# Patient Record
Sex: MALE
[De-identification: names, ages, dates, MRNs, and addresses within clinical notes are randomized per-mention and may not be internally consistent; named-entity substitution may affect disease eponyms.]

---

## 2022-09-28 ENCOUNTER — APPOINTMENT (OUTPATIENT)
Dept: ORTHOPEDIC SURGERY | Facility: CLINIC | Age: 52
End: 2022-09-28

## 2022-09-28 VITALS — WEIGHT: 265 LBS | BODY MASS INDEX: 35.89 KG/M2 | HEIGHT: 72 IN

## 2022-09-28 PROBLEM — Z00.00 ENCOUNTER FOR PREVENTIVE HEALTH EXAMINATION: Status: ACTIVE | Noted: 2022-09-28

## 2022-09-28 PROCEDURE — 99072 ADDL SUPL MATRL&STAF TM PHE: CPT

## 2022-09-28 PROCEDURE — 73130 X-RAY EXAM OF HAND: CPT | Mod: LT

## 2022-09-28 PROCEDURE — 99204 OFFICE O/P NEW MOD 45 MIN: CPT

## 2022-09-28 RX ORDER — METHYLPREDNISOLONE 4 MG/1
4 TABLET ORAL
Qty: 1 | Refills: 0 | Status: ACTIVE | COMMUNITY
Start: 2022-09-28 | End: 1900-01-01

## 2022-09-28 NOTE — HISTORY OF PRESENT ILLNESS
[de-identified] : Patient comes in after work related injury that occurred in June.  He says that he was using a rig jerked backwards jammed his ring and small fingers twice and hit his hand.  Since that time he is having difficulty bending the small finger.  He has had numbness and tingling as well.  He thought it would improve.  It did not improve.  He saw a no other doctor who sent him for an MRI.  He does not have other complaints today.  He has been wearing a cock-up wrist splint.

## 2022-09-28 NOTE — ASSESSMENT
[FreeTextEntry1] :  while the show that the FDP was intact in the superficialis was ruptured, I believe it is the other way around.  I reviewed the MRI  myself in did see tendon attached to the DIP or the distal phalanx.  The patient unfortunately is unable to flex at the DIP joint.  Either way, it is 3 months after his injury if not more.  A flexor tendon rupture should be repaired within 2 weeks and at most by 6 weeks and he has past that point.  I discussed the possibility with the patient going in and performing an exploration depending upon where the rupture is.  There is a possibility that there he shins to the ruptured tendon to the other tendon not allowing for flexion.  The option also is left that he does not need to do any surgery for this and he will not be able to flex at the DIP joint but he will still be able to move his fingers.  If we do some sort of surgery to repair 1 of the tendons there is a chance he may always have somewhat of a contracture, he may always have some scar tissue, he may always needed Tenolysis and he may lose motion in the hand worse than it currently is.  Right now the patient is going to think about what he would like to do regarding this problem.\par \par The patient was advised of the diagnosis.  The natural history of the pathology was explained in full to the patient in layman's terms. We discussed the nature of the nerve as an electrical cable and what happens to the nerve in carpal tunnel syndrome.  We discussed that treatment for night symptoms included night bracing.  We discussed the possibility of injection when symptoms were intermittent or in patients who were unwilling to undergo surgery with constant symptoms.  We discussed that injection is a diagnostic and therapeutic aide and what this means.  We discussed the use of nerve testing in cases when diagnosis was in doubt or for confirmation to exclude alternate pathology.  We discussed that if symptoms were 24/7 surgery was recommended to give the nerve the best chance to recover but that once symptoms were constant, the nerve may not recover even with surgery.  We discussed that if left alone the nerve progression could worsen and that treatment was indicated to prevent progression of nerve compression.  The longer the nerve is left, the more likely to cause worsening irreversible damage.  All questions were answered.  The risks and benefits of surgical and non-surgical treatment alternatives were explained in full to the patient.\par   I believe the patient has developed carpal tunnel after this injury as well.  He has numbness and tingling at all times.  I would like an EMG NCV.  I am also sending a Medrol Dosepak.  He will follow up with me once the EMG NCV is complete.  He will also call if he decides he would like to move forward with surgical intervention for the tendon.

## 2022-09-28 NOTE — IMAGING
[de-identified] : L hand: \par Tender volar wrist \par Good finger ROM \par +Tinels \par +Phalens \par +Compression test \par   Tingling sensation median nerve distribution\par  able to flex PIP joint small finger unable to flex DIP joint\par

## 2022-10-12 ENCOUNTER — TRANSCRIPTION ENCOUNTER (OUTPATIENT)
Age: 52
End: 2022-10-12

## 2022-10-13 ENCOUNTER — FORM ENCOUNTER (OUTPATIENT)
Age: 52
End: 2022-10-13

## 2022-11-02 ENCOUNTER — APPOINTMENT (OUTPATIENT)
Dept: ORTHOPEDIC SURGERY | Facility: CLINIC | Age: 52
End: 2022-11-02

## 2022-11-02 PROCEDURE — 99214 OFFICE O/P EST MOD 30 MIN: CPT

## 2022-11-02 PROCEDURE — 99072 ADDL SUPL MATRL&STAF TM PHE: CPT

## 2022-11-04 NOTE — WORK
[Total] : total [Does not reveal pre-existing condition(s) that may affect treatment/prognosis] : does not reveal pre-existing condition(s) that may affect treatment/prognosis [Cannot return to work because ________] : cannot return to work because [unfilled] [I provided the services listed above] :  I provided the services listed above. [FreeTextEntry1] : fair [FreeTextEntry3] : lg

## 2022-11-04 NOTE — HISTORY OF PRESENT ILLNESS
[de-identified] : The patient comes in for follow-up of his numbness and tingling in left hand.  After a gave him Medrol Dosepak he says the range of motion is gone significantly better the swelling has gotten better but he still having numbness and tingling.  That has not changed.  It has been 5 months since his injury.

## 2022-11-04 NOTE — IMAGING
[de-identified] : L hand: \par Tender volar wrist \par Good finger ROM \par +Tinels \par +Phalens \par +Compression test \par   Tingling sensation median nerve distribution\par  able to flex PIP joint small finger unable to flex DIP joint\par

## 2022-11-04 NOTE — ASSESSMENT
[FreeTextEntry1] :   The patient is doing okay regarding range of motion of fingers.  He is okay that the tip of the small finger does not bend right now.  The numbness and tingling is with bothers him significantly.The EMG NCV shows moderate carpal tunnel.\par \par We discussed the recommendation for carpal tunnel surgery- We reviewed that the goal of surgery is to prevent worsening and give the nerve a chance to recover. If symptoms are constant there is a chance that the nerve is already too badly damaged and no longer has the potential to recover.Risks, benefits, and alternatives of surgery discussed with patient (and family).Risks including but not limited to infection, blood loss, tendon damage, muscle damage, nerve damage, stiffness, pain, no resolution of symptoms, CRPS, loss of function, potential for secondary surgery, loss of limb or life.Patient understands and was allowed to voice questions or concerns.They agree to surgery\par   Patient has numbness and tingling.  He has not improved in 5 months.  I am recommending surgical intervention.  We also need authorization for therapy.

## 2022-11-14 ENCOUNTER — FORM ENCOUNTER (OUTPATIENT)
Age: 52
End: 2022-11-14

## 2022-11-15 ENCOUNTER — FORM ENCOUNTER (OUTPATIENT)
Age: 52
End: 2022-11-15

## 2022-12-14 ENCOUNTER — APPOINTMENT (OUTPATIENT)
Dept: ORTHOPEDIC SURGERY | Facility: CLINIC | Age: 52
End: 2022-12-14

## 2022-12-14 PROCEDURE — 99213 OFFICE O/P EST LOW 20 MIN: CPT

## 2022-12-14 PROCEDURE — 99072 ADDL SUPL MATRL&STAF TM PHE: CPT

## 2022-12-14 NOTE — IMAGING
[de-identified] : L hand: \par Tender volar wrist \par Good finger ROM \par +Tinels \par +Phalens \par +Compression test \par   Tingling sensation median nerve distribution\par  able to flex PIP joint small finger unable to flex DIP joint, decreased  strength\par

## 2022-12-14 NOTE — HISTORY OF PRESENT ILLNESS
[de-identified] : Patient still has numbness and tingling in his hand.  He says that he is able to make a  better than before but still cannot make a full .  He has difficulty grabbing things.  The small finger bothers him.  Numbness and tingling bothers him as well.

## 2022-12-14 NOTE — ASSESSMENT
[FreeTextEntry1] :   The patient is doing okay regarding range of motion of fingers.  He is okay that the tip of the small finger does not bend right now.  The numbness and tingling is with bothers him significantly.The EMG NCV shows moderate carpal tunnel.\par \par   The patient carpal tunnel symptoms.  He is trying to get this established.  As result of his fall he developed carpal tunnel.  The small finger bothers him significantly.  I am recommending therapy for the small finger so he can work on the  strength and range of motion.

## 2022-12-20 ENCOUNTER — FORM ENCOUNTER (OUTPATIENT)
Age: 52
End: 2022-12-20

## 2023-01-25 ENCOUNTER — APPOINTMENT (OUTPATIENT)
Dept: ORTHOPEDIC SURGERY | Facility: CLINIC | Age: 53
End: 2023-01-25
Payer: OTHER MISCELLANEOUS

## 2023-01-25 PROCEDURE — 99072 ADDL SUPL MATRL&STAF TM PHE: CPT

## 2023-01-25 PROCEDURE — 99213 OFFICE O/P EST LOW 20 MIN: CPT

## 2023-01-25 NOTE — HISTORY OF PRESENT ILLNESS
[de-identified] : Patient still has numbness and tingling in his hand. He said on [place] he couldn't feel his hands.He has been going to therapy. He said the exercises he is doing brings some relief. He is currently not working. He is getting frustrated that he cannot make a full fist.  He has not gotten the carpal tunnel made as part of the case.\par \par

## 2023-01-25 NOTE — ASSESSMENT
[FreeTextEntry1] :   The patient is doing okay regarding range of motion of fingers.  He is okay that the tip of the small finger does not bend right now.  The numbness and tingling is with bothers him significantly.The EMG NCV shows moderate carpal tunnel.\par \par   The patient carpal tunnel symptoms.  He is trying to get this established.  As result of his fall he developed carpal tunnel.  The small finger bothers him significantly.  I am recommending therapy for the small finger so he can work on the  strength and range of motion.\par \par   He is going to work on getting the carpal tunnel established as part of the case.

## 2023-01-25 NOTE — IMAGING
[de-identified] : L hand: \par Tender volar wrist \par Good finger ROM \par +Tinels \par +Phalens \par +Compression test \par   Tingling sensation median nerve distribution\par  able to flex PIP joint small finger unable to flex DIP joint, decreased  strength\par

## 2023-03-15 ENCOUNTER — APPOINTMENT (OUTPATIENT)
Dept: ORTHOPEDIC SURGERY | Facility: CLINIC | Age: 53
End: 2023-03-15
Payer: OTHER MISCELLANEOUS

## 2023-03-15 ENCOUNTER — NON-APPOINTMENT (OUTPATIENT)
Age: 53
End: 2023-03-15

## 2023-03-15 PROCEDURE — 99072 ADDL SUPL MATRL&STAF TM PHE: CPT

## 2023-03-15 PROCEDURE — 99213 OFFICE O/P EST LOW 20 MIN: CPT

## 2023-03-15 NOTE — IMAGING
[de-identified] : L hand: \par Tender volar wrist \par Good finger ROM \par +Tinels \par +Phalens \par +Compression test \par Normal sensation median nerve distribution\par  able to flex PIP joint small finger unable to flex DIP joint, decreased  strength however improved\par

## 2023-03-15 NOTE — WORK
[Does not reveal pre-existing condition(s) that may affect treatment/prognosis] : does not reveal pre-existing condition(s) that may affect treatment/prognosis [I provided the services listed above] :  I provided the services listed above. [Partial] : partial [Can return to work without limitations on ______] : can return to work without limitations on [unfilled] [FreeTextEntry1] : fair [FreeTextEntry3] : lg

## 2023-03-15 NOTE — HISTORY OF PRESENT ILLNESS
[de-identified] : Patient still has numbness and tingling in his hand but not all the time. It is not as bad as it was. He wears his braces at night. \par He has been going to therapy. He is currently not working.  He says the numbness and tingling comes and goes it is not as bad and is something that he can deal with.  Some days he thinks it is completely gone.  He still is having a problem with the small finger.  But he is able to use it.  He says sometimes he has pain and some tightness in the finger.\par \par

## 2023-03-15 NOTE — ASSESSMENT
[FreeTextEntry1] : The patient is doing okay regarding range of motion of fingers.  He is okay that the tip of the small finger does not bend right now.  He has been trying to use his hand normally.\par The patient carpal tunnel symptoms.  He did not have carpal tunnel symptoms prior to this injury.  This only occurred after the injury at work.  He does have carpal tunnel symptoms and it was a result or did result after this injury occurred.  He is going to return to work.  I will see him in 6 to 8 weeks

## 2023-05-02 ENCOUNTER — APPOINTMENT (OUTPATIENT)
Dept: ORTHOPEDIC SURGERY | Facility: CLINIC | Age: 53
End: 2023-05-02
Payer: OTHER MISCELLANEOUS

## 2023-05-02 PROCEDURE — 99213 OFFICE O/P EST LOW 20 MIN: CPT

## 2023-05-03 NOTE — IMAGING
[de-identified] : L hand: \par Tender volar wrist \par Good finger ROM \par +Tinels \par +Phalens \par +Compression test \par Normal sensation median nerve distribution\par  able to flex PIP joint small finger unable to flex DIP joint, decreased  strength \par

## 2023-05-03 NOTE — ASSESSMENT
[FreeTextEntry1] : Patient is doing better regarding the carpal tunnel.  He will continue doing what he has been doing.  If the symptoms return he will return and we will address that.  His main issue is not being able to make a full  and have the same strength.  I am recommending more therapy for the patient.  He will follow-up in about 8 weeks.

## 2023-05-03 NOTE — HISTORY OF PRESENT ILLNESS
[de-identified] : Patient still has numbness and tingling in his hand but not all the time. It is not as bad as it was. He wears his braces at night. \par He states the numbness and tingling is a lot better than prior.  He still does not have a full strength in the hand.  He is getting better range of motion but not full.  He is aware that he will not gain motion at the DIP joint.  He is having a problem with the ring finger because it is closely related to the small finger.\par

## 2023-07-11 ENCOUNTER — APPOINTMENT (OUTPATIENT)
Dept: ORTHOPEDIC SURGERY | Facility: CLINIC | Age: 53
End: 2023-07-11
Payer: OTHER MISCELLANEOUS

## 2023-07-11 PROCEDURE — 99213 OFFICE O/P EST LOW 20 MIN: CPT

## 2023-07-11 NOTE — HISTORY OF PRESENT ILLNESS
[de-identified] : The patient comes in for a follow up. He started therapy. He is on his 4th session. He states his strength has improved.  He states his numbness and tingling is a lot better. He states he is having mild symptoms of carpal tunnel.

## 2023-07-11 NOTE — ASSESSMENT
[FreeTextEntry1] : The patient is improving. He will continue with therapy. The carpal tunnel has been improving as well. He will follow up in 6-8 weeks.

## 2023-07-11 NOTE — IMAGING
[de-identified] : L hand: \par Tender volar wrist \par Good finger ROM \par +Tinels \par +Phalens \par +Compression test \par Normal sensation median nerve distribution\par  able to flex PIP joint small finger unable to flex DIP joint, decreased  strength \par

## 2023-08-22 ENCOUNTER — APPOINTMENT (OUTPATIENT)
Dept: ORTHOPEDIC SURGERY | Facility: CLINIC | Age: 53
End: 2023-08-22
Payer: OTHER MISCELLANEOUS

## 2023-08-22 PROCEDURE — 99214 OFFICE O/P EST MOD 30 MIN: CPT

## 2023-08-25 NOTE — IMAGING
[de-identified] : L hand: \par  Tender volar wrist \par  Good finger ROM \par  +Tinels \par  +Phalens \par  +Compression test \par  Normal sensation median nerve distribution\par   able to flex PIP joint small finger unable to flex DIP joint, decreased  strength \par

## 2023-08-25 NOTE — ASSESSMENT
[FreeTextEntry1] : We discussed the recommendation for carpal tunnel surgery- We reviewed that the goal of surgery is to prevent worsening and give the nerve a chance to recover. If symptoms are constant there is a chance that the nerve is already too badly damaged and no longer has the potential to recover.Risks, benefits, and alternatives of surgery discussed with patient (and family).Risks including but not limited to infection, blood loss, tendon damage, muscle damage, nerve damage, stiffness, pain, no resolution of symptoms, CRPS, loss of function, potential for secondary surgery, loss of limb or life.Patient understands and was allowed to voice questions or concerns. He will discuss surgery with his supervisor at work. He will contact us if he wants to move forward with surgical intervention.  If he does not wish to move forward with surgical intervention, he will follow up in 6-8 weeks.

## 2023-08-25 NOTE — HISTORY OF PRESENT ILLNESS
[de-identified] : The patient comes in for a follow up.  The patient states therapy has not helped. He states he does not seen any improvement from last visit.

## 2024-02-22 ENCOUNTER — APPOINTMENT (OUTPATIENT)
Dept: ORTHOPEDIC SURGERY | Facility: CLINIC | Age: 54
End: 2024-02-22
Payer: OTHER GOVERNMENT

## 2024-02-22 ENCOUNTER — NON-APPOINTMENT (OUTPATIENT)
Age: 54
End: 2024-02-22

## 2024-02-22 VITALS — WEIGHT: 270 LBS | BODY MASS INDEX: 36.57 KG/M2 | HEIGHT: 72 IN

## 2024-02-22 DIAGNOSIS — S46.012A STRAIN OF MUSCLE(S) AND TENDON(S) OF THE ROTATOR CUFF OF LEFT SHOULDER, INITIAL ENCOUNTER: ICD-10-CM

## 2024-02-22 PROCEDURE — 99213 OFFICE O/P EST LOW 20 MIN: CPT | Mod: ACP

## 2024-02-22 PROCEDURE — 73030 X-RAY EXAM OF SHOULDER: CPT | Mod: LT

## 2024-02-22 RX ORDER — DICLOFENAC SODIUM 75 MG/1
75 TABLET, DELAYED RELEASE ORAL
Qty: 60 | Refills: 0 | Status: ACTIVE | COMMUNITY
Start: 2024-02-22 | End: 1900-01-01

## 2024-02-22 RX ORDER — TIZANIDINE 4 MG/1
4 TABLET ORAL
Qty: 30 | Refills: 0 | Status: ACTIVE | COMMUNITY
Start: 2024-02-22 | End: 1900-01-01

## 2024-02-22 NOTE — IMAGING
[de-identified] : Examination of the left shoulder, patient has no swelling, no ecchymosis, no erythema. Some discomfort on palpating over the posterior aspect of the shoulder. Nontender over the AC joint. Patient has painful range of motion of the arc of motion of the left shoulder. Patient has negative drop arm test, but patient has positive resisted supraspinatus. Positive impingement. Positive [place]'s. Positive apprehension. Negative speeds. Mild decreased motor strength to the rotator cuff muscles. Neurovascular intact.  X-ray of the left shoulder was done in office today, negative for: Acute fracture or dislocation, patient has degenerative joint disease over the AC joint and mild high riding of the humeral head.

## 2024-02-22 NOTE — HISTORY OF PRESENT ILLNESS
[de-identified] : 53-year-old male here for an aspiration of pain to the left shoulder, patient states that this pain is started after he was taking some tracks at work, patient states that this pain is started yesterday.  21st 2024, and the pain kept him up throughout the night, this morning he has severe pain when he tried to put his jacket, he states that he went to work, but he could not continue doing the activities needed for work, patient states that the pain is significant.

## 2024-02-22 NOTE — DISCUSSION/SUMMARY
[de-identified] : Impression: Left shoulder injury possible rotator cuff strain.  Plan: Patient was advised for physical therapy. Patient was advised for resting, icing and activities as tolerated. Patient was advised to stay out of work until Monday.  26 2024 Anti-inflammatories and muscle relaxant was sent to the pharmacy.  Follow-up: 4 to 6 weeks for shoulder specialist

## 2024-02-28 ENCOUNTER — NON-APPOINTMENT (OUTPATIENT)
Age: 54
End: 2024-02-28

## 2024-03-19 ENCOUNTER — APPOINTMENT (OUTPATIENT)
Dept: ORTHOPEDIC SURGERY | Facility: CLINIC | Age: 54
End: 2024-03-19
Payer: OTHER MISCELLANEOUS

## 2024-03-19 PROCEDURE — 99213 OFFICE O/P EST LOW 20 MIN: CPT

## 2024-03-20 NOTE — WORK
[Does not reveal pre-existing condition(s) that may affect treatment/prognosis] : does not reveal pre-existing condition(s) that may affect treatment/prognosis [I provided the services listed above] :  I provided the services listed above. [Can return to work without limitations on ______] : can return to work without limitations on [unfilled] [Mild Partial] : mild partial [FreeTextEntry1] : fair [FreeTextEntry3] : lg

## 2024-03-20 NOTE — IMAGING
[de-identified] : L hand:  Tender volar wrist  Good finger ROM  -Tinels  -Phalens  -Compression test  Normal sensation median nerve distribution  able to flex PIP joint small finger unable to flex DIP joint, decreased  strength

## 2024-03-20 NOTE — ASSESSMENT
[FreeTextEntry1] : The patient is improving. He states he will consider surgery, if the numbness and tingling gets worse. He will speak to his  about a schedule loss of use.

## 2024-03-20 NOTE — HISTORY OF PRESENT ILLNESS
[de-identified] : The patient comes in for a follow up. The patient states he is doing well. The patient states he is unable to bend his left small finger. He states he does not have much numbness and tingling.

## 2024-03-22 ENCOUNTER — APPOINTMENT (OUTPATIENT)
Dept: ORTHOPEDIC SURGERY | Facility: CLINIC | Age: 54
End: 2024-03-22

## 2024-04-12 ENCOUNTER — APPOINTMENT (OUTPATIENT)
Dept: ORTHOPEDIC SURGERY | Facility: CLINIC | Age: 54
End: 2024-04-12
Payer: OTHER MISCELLANEOUS

## 2024-04-12 DIAGNOSIS — G56.02 CARPAL TUNNEL SYNDROME, LEFT UPPER LIMB: ICD-10-CM

## 2024-04-12 DIAGNOSIS — S66.812A STRAIN OF OTHER SPECIFIED MUSCLES, FASCIA AND TENDONS AT WRIST AND HAND LEVEL, LEFT HAND, INITIAL ENCOUNTER: ICD-10-CM

## 2024-04-12 PROCEDURE — 99243 OFF/OP CNSLTJ NEW/EST LOW 30: CPT

## 2024-04-12 NOTE — ASSESSMENT
[FreeTextEntry1] : The patient comes in a scheduled loss of use. The patient states he is unable to make a tight fist.   Left Hand: Thumb: MPJ  60 degrees, IPJ 65 degrees, Index Finger: MPJ 90 degrees, PIPJ 95 degrees, DIPJ 70 degrees Middle Finger:  MPJ 95 degrees, PIPJ 95 degrees, DIPJ 70 degrees Ring Finger: MPJ 97 degrees, PIPJ 100 degrees, DIPJ 70 degrees Small Finger: MPJ 97 degrees, PIPJ 105 degrees, DIPJ 70 degrees positive Durkan's, no active flexion of DIPJ small finger dynamometer: 55 lbs. of force, 52 lbs. of force,  40 lbs. of force    Right Hand Thumb: MPJ  60 degrees, IPJ 75 degrees Index Finger: MPJ 95 degrees, PIPJ 115 degrees, DIPJ 80 degrees Middle Finger:  MPJ 95 degrees, PIPJ 115 degrees, DIPJ 80 degrees Ring Finger: MPJ  100 degrees, PIPJ 115 degrees, DIPJ 80 degrees Small Finger:  degrees, PIPJ 115 degrees, DIPJ 80 degrees dynamometer: 2 x 95 lbs. of force, 90 lbs. of force

## 2024-09-24 ENCOUNTER — APPOINTMENT (OUTPATIENT)
Dept: ORTHOPEDIC SURGERY | Facility: CLINIC | Age: 54
End: 2024-09-24
Payer: OTHER MISCELLANEOUS

## 2024-09-24 DIAGNOSIS — S46.012A STRAIN OF MUSCLE(S) AND TENDON(S) OF THE ROTATOR CUFF OF LEFT SHOULDER, INITIAL ENCOUNTER: ICD-10-CM

## 2024-09-24 PROCEDURE — 99203 OFFICE O/P NEW LOW 30 MIN: CPT

## 2024-09-24 NOTE — HISTORY OF PRESENT ILLNESS
[de-identified] : Patient is here for evaluation of left shoulder pain Affecting quality of life Wakes up at night due to pain  wc injury  NAD Left shoulder: TTP ant GH joint, bicipital groove FF 0-160 ER 60 IR T12 4/5 strength scapular abduction, ER, IR Pos Impingement Pos Martinez Pos Cross Arm Adduction Negative instability  XRay left shoulder negative for fracture, dislocation, arthritis  Plan went over findings explained the imaging needs an mri as clinically he has an injury to the RC pt left shoulder

## 2024-11-05 ENCOUNTER — APPOINTMENT (OUTPATIENT)
Dept: ORTHOPEDIC SURGERY | Facility: CLINIC | Age: 54
End: 2024-11-05

## 2024-11-23 ENCOUNTER — OUTPATIENT (OUTPATIENT)
Dept: OUTPATIENT SERVICES | Facility: HOSPITAL | Age: 54
LOS: 1 days | End: 2024-11-23
Payer: COMMERCIAL

## 2024-11-23 ENCOUNTER — RESULT REVIEW (OUTPATIENT)
Age: 54
End: 2024-11-23

## 2024-11-23 DIAGNOSIS — Z00.8 ENCOUNTER FOR OTHER GENERAL EXAMINATION: ICD-10-CM

## 2024-11-23 DIAGNOSIS — M25.512 PAIN IN LEFT SHOULDER: ICD-10-CM

## 2024-11-23 PROCEDURE — 73221 MRI JOINT UPR EXTREM W/O DYE: CPT | Mod: 26,LT

## 2024-11-23 PROCEDURE — 73221 MRI JOINT UPR EXTREM W/O DYE: CPT | Mod: LT

## 2024-11-24 DIAGNOSIS — M25.512 PAIN IN LEFT SHOULDER: ICD-10-CM

## 2024-12-03 ENCOUNTER — APPOINTMENT (OUTPATIENT)
Dept: ORTHOPEDIC SURGERY | Facility: CLINIC | Age: 54
End: 2024-12-03

## 2024-12-12 ENCOUNTER — APPOINTMENT (OUTPATIENT)
Dept: ORTHOPEDIC SURGERY | Facility: CLINIC | Age: 54
End: 2024-12-12
Payer: OTHER MISCELLANEOUS

## 2024-12-12 ENCOUNTER — NON-APPOINTMENT (OUTPATIENT)
Age: 54
End: 2024-12-12

## 2024-12-12 DIAGNOSIS — S46.012A STRAIN OF MUSCLE(S) AND TENDON(S) OF THE ROTATOR CUFF OF LEFT SHOULDER, INITIAL ENCOUNTER: ICD-10-CM

## 2024-12-12 PROCEDURE — 20610 DRAIN/INJ JOINT/BURSA W/O US: CPT | Mod: LT

## 2024-12-12 PROCEDURE — 99214 OFFICE O/P EST MOD 30 MIN: CPT | Mod: 25

## 2025-01-14 ENCOUNTER — NON-APPOINTMENT (OUTPATIENT)
Age: 55
End: 2025-01-14

## 2025-01-21 ENCOUNTER — APPOINTMENT (OUTPATIENT)
Dept: ORTHOPEDIC SURGERY | Facility: CLINIC | Age: 55
End: 2025-01-21
Payer: OTHER MISCELLANEOUS

## 2025-01-21 DIAGNOSIS — S46.012A STRAIN OF MUSCLE(S) AND TENDON(S) OF THE ROTATOR CUFF OF LEFT SHOULDER, INITIAL ENCOUNTER: ICD-10-CM

## 2025-01-21 PROCEDURE — 99214 OFFICE O/P EST MOD 30 MIN: CPT

## 2025-01-21 RX ORDER — MELOXICAM 15 MG/1
15 TABLET ORAL
Qty: 30 | Refills: 1 | Status: ACTIVE | COMMUNITY
Start: 2025-01-21 | End: 1900-01-01

## 2025-04-22 ENCOUNTER — APPOINTMENT (OUTPATIENT)
Dept: ORTHOPEDIC SURGERY | Facility: CLINIC | Age: 55
End: 2025-04-22

## 2025-05-13 ENCOUNTER — APPOINTMENT (OUTPATIENT)
Dept: ORTHOPEDIC SURGERY | Facility: CLINIC | Age: 55
End: 2025-05-13
Payer: OTHER MISCELLANEOUS

## 2025-05-13 DIAGNOSIS — S46.012A STRAIN OF MUSCLE(S) AND TENDON(S) OF THE ROTATOR CUFF OF LEFT SHOULDER, INITIAL ENCOUNTER: ICD-10-CM

## 2025-05-13 PROCEDURE — 99214 OFFICE O/P EST MOD 30 MIN: CPT

## 2025-05-13 RX ORDER — IBUPROFEN 600 MG/1
600 TABLET ORAL 3 TIMES DAILY
Qty: 90 | Refills: 1 | Status: ACTIVE | COMMUNITY
Start: 2025-05-13 | End: 1900-01-01

## 2025-06-10 ENCOUNTER — NON-APPOINTMENT (OUTPATIENT)
Age: 55
End: 2025-06-10

## 2025-07-15 ENCOUNTER — APPOINTMENT (OUTPATIENT)
Dept: ORTHOPEDIC SURGERY | Facility: CLINIC | Age: 55
End: 2025-07-15
Payer: OTHER MISCELLANEOUS

## 2025-07-15 PROCEDURE — 99213 OFFICE O/P EST LOW 20 MIN: CPT

## 2025-08-19 ENCOUNTER — NON-APPOINTMENT (OUTPATIENT)
Age: 55
End: 2025-08-19

## 2025-08-26 ENCOUNTER — APPOINTMENT (OUTPATIENT)
Dept: ORTHOPEDIC SURGERY | Facility: CLINIC | Age: 55
End: 2025-08-26